# Patient Record
Sex: FEMALE | Race: WHITE
[De-identification: names, ages, dates, MRNs, and addresses within clinical notes are randomized per-mention and may not be internally consistent; named-entity substitution may affect disease eponyms.]

---

## 2017-06-08 NOTE — ER
DATE SEEN:  2017

 

TIME SEEN:  The patient was seen at 1000 hours.

 

CHIEF COMPLAINT:  Vaginal bleeding.

 

HISTORY OF PRESENT ILLNESS:  This  26-year-old  3, para 1-1-0-1,

last menstrual period 2017 (9 weeks pregnant) had intercourse this morning

and resulted in nonpainful postcoital vaginal bleeding, small amount.  She is

wondering if she is losing her pregnancy.

 

PAST MEDICAL HISTORY:  Significant for 1 miscarriage.  She states she is O

negative.

 

MEDICATIONS:  She is on dextroamphetamine - Adderall for probable ADHD.

 

SOCIAL HISTORY:  Works at [employer].  Does not smoke or drink alcohol or

use street drugs.

 

Otherwise, the patient is healthy.

 

REVIEW OF SYSTEMS:  HEENT:  Denies headaches, compromise in vision.

CARDIORESPIRATORY:  Denies chest pain, shortness of breath, or cough.  ABDOMEN:

Denies abdominal pain, nausea, vomiting, diarrhea, constipation, blood in the

stool, black tarry stool, hepatitis, or reflux.  :  As noted above.

MUSCULOSKELETAL:  Denies aches, pains, arthralgia, or myalgia.  NEUROLOGIC:

Negative for stroke, seizures, or head injuries.  PSYCHIATRIC:  Negative.

 

PHYSICAL EXAMINATION:  VITAL SIGNS:  Blood pressure 120/76, heart rate is 97,

respirations 18, oxygen saturation 100% on room, and temperature 36.9 degrees

centigrade.  The patient's weight 81.6 kg.  BMI 27.4.  GENERAL:  Alert woman,

somewhat tearful and anxious.  HEENT:  She has notable to 2 studs of lip

piercing in right bilateral lower lip.  PERRLA intact.  Pharynx without

abnormality.  Mucosa is moist.  NECK:  No cervical adenopathy.  No thyromegaly.

LUNGS:  Clear to auscultation without rales, rhonchi, or wheezes.  HEART:  S1,

S2.  No irregular rate and rhythm.  ABDOMEN:  Soft.  No guarding.  No abdominal

discomfort.  No CVA percussion tenderness.  PELVIC:  Not performed.  The patient

would prefer to have an ultrasound.  EXTREMITIES:  Lower extremities without

abnormality.  Deep tendon reflexes normal in upper and lower extremities.

Cranial nerves 2 through 12 intact.  Oriented x3.  PSYCHIATRIC:  The patient is

sad and is intermittently tearful.

 

LABORATORY DATA:  Pelvic ultrasound was performed, blighted ovum noted.  Serum

HCG was 1800.  Urine, occasional bacteria, moderate squamous epithelial cells,

20 wbc's, 50 to 75 rbc's, large occult blood, and specific gravity 0.010.

 

ASSESSMENT:

1. Incomplete  with vaginal bleeding.

2. Blighted ovum.

3. Vaginal bleeding secondary to incomplete .

4. The patient is not O negative, she is O positive.  The patient's blood was

    typed and screen.

 

PLAN:

1. The patient's status of incomplete  discussed with her, but she was

    quite sad and depressed over this, and had cried a great deal.

2. Follow up with doctor in 2 days.  Repeat the quantitative serum HCG.

3. The patient is not O negative, so will not require RhoGAM the patient.

4. It is possible that the patient's HCG may climb, it is rare that does

    happen, but it can climb in spite of being low at this juncture in her

    presumed pregnancy.

 

Job#: [id number]

DD: 2017 1448

DT: 2017 MILAGROS/MAUREEN

## 2017-06-09 NOTE — US
INDICATION:  First trimester bleeding. 



OB ULTRASOUND FIRST TRIMESTER:  Utilizing transabdominal probe, multiple 
ultrasonic images were obtained, revealing the right ovary to measure 3.29 x 
1.60 cm.  The left ovary measured 2.35 x 1.52 cm. 



Neither ovary remarkable in appearance.  



No mass lesions or free fluid collections were suggested.  No adnexal mass 
lesions specifically were seen.  



The uterus measured 8.52 x 4.24 x 6 cm. 



What appears to be an intrauterine gestation is noted.  A definite fetal pole 
was not seen.  Endovaginal probe ultrasound will be necessary for further 
evaluation.  



IMPRESSION:  Apparent intrauterine gestation.  No definite fetal pole 
identified - endovaginal probe ultrasound will be necessary for further 
evaluation.





INDICATION:  First trimester bleeding, unable to see fetal pole.  



OB ULTRASOUND TRANSVAGINAL:  Utilizing transvaginal probe, multiple ultrasonic 
images revealed no adnexal mass lesions or free fluid collections.  



Gestational sac size - intrauterine and in the fundal area - was compatible 
with approximately 6 weeks 5 days gestational age compared with the LMP GA of 9 
weeks 3 days.  This is a big discrepancy.  



Despite use of endovaginal probe, a fetal pole was not identified.  Also no 
fetal heart motion could be identified.  



IMPRESSION:  No definite fetal pole or fetal heart motion identified in this 
delayed - 6 week 5 day - intrauterine gestational sac.  Findings suggest 
"blighted ovum"/early fetal demise.



MTDD

## 2017-06-22 NOTE — PREOP
ADMISSION DATE:  2017

 

CHIEF COMPLAINT:  Incomplete miscarriage with persistent cramps and bleeding.

 

HISTORY OF PRESENT ILLNESS:  This patient is a  26-year-old 

female,  3, para 1-1-0-1, who was diagnosed with a blighted ovum on

2016 via ultrasound, since then she has had serial beta HCG levels that

have declined and a repeat ultrasound on 2017 revealed persistent blighted

ovum with tissue.  The patient is continuing to have cramps, intermittent having

bleeding and to the point now where she would like to "move on with her life."

Risks and benefits of the surgical intervention have been discussed in detail

and she would like to proceed.  She was therefore scheduled for D and C.  She

has had no fever, chills, or night sweats.  Denies any dysuria or hematuria.

She at times pass clots, but does not feel like she has been passing any tissue.

She has rather severe cramps at times that even with the use of ibuprofen are

unrelenting.  She denies any melena, hematochezia, hematemesis, hemoptysis, or

hematuria.  Has had no other complaints.  She takes Adderall in the extended

release form 30 mg daily and then 10 mg of immediate release in the afternoon,

Zoloft 50 mg daily, and prenatal vitamin daily.

 

ALLERGIES:  Lamictal.

 

SOCIAL HISTORY:  She does not smoke or use alcohol.

 

PAST MEDICAL HISTORY:  Pertinent that she has had a previous miscarriage that

resolved without surgical intervention a number of years ago.  She had a

 section delivery on 2015 and has had a previous wisdom teeth

extraction.

 

FAMILY HISTORY:  Father passed away at age 41 from a farming accident.  He

apparently was otherwise in good health.  Mother is age 57 and in good health.

She has two living brothers, age 29 and 36, both in good health.

 

REVIEW OF SYSTEMS:  Full review of systems was negative except for that

mentioned above.

 

PHYSICAL EXAMINATION:  VITAL SIGNS:  At this time showed her to be afebrile.

Blood pressure 108/66, pulse is 90 and regular, weight was 187.7 pounds.

GENERAL:  Well-developed, well-nourished female, in no acute distress.  HEENT:

Unremarkable.  Mucous membranes are pink and moist.  Thyroid was not enlarged.

Trachea is midline.  CHEST:  Clear to auscultation and percussion.

CARDIOVASCULAR:  Revealed a normal S1 and S2 without murmur, rub, or gallop.

ABDOMEN:  Soft with a healed Pfannenstiel incisional scar.  There was no

specific point tenderness.  No rebound or rigidity.  Bowel sounds are normal.

EXTREMITIES:  Without clubbing, no edema.  No ulcerations or areas of breakdown

were noted.  NEUROLOGIC:  She was intact.

 

IMPRESSION:  Incomplete spontaneous miscarriage.

 

PLAN:  We discussed the risks and benefits of the surgical intervention at

length and after long discussion, she agreed, she would like to proceed with

this surgical intervention and not wait any longer to "get back to her life."

The surgery was scheduled for tomorrow at 0800 hours and she will be kept n.p.o.

after midnight.  She will arrive at the hospital at 0645 hours for preparation

and then proceed from there.

 

Job#: [id number]

DD: 2017 1753

DT: 2017 1842 /MAUREEN

## 2017-06-23 NOTE — OR
DATE OF OPERATION:  2016

 

SURGEON:  Jamal Brandon MD

 

PREOPERATIVE DIAGNOSIS:  Incomplete spontaneous .

 

POSTOPERATIVE DIAGNOSIS:  Incomplete spontaneous .

 

PROCEDURE:  D and C.

 

PROCEDURE IN DETAIL:  After time-out was taken to identify the patient, patient

date of birth, allergies, and appropriate procedure, she was placed in a dorsal

lithotomy position under MAC anesthesia.  The perineum was prepped and draped in

a sterile manner.  Bimanual exam was done revealing approximately eight-sized

anteverted uterus with no adnexal masses.  The cervix would easily accept a

fingertip.  Weighted vaginal speculum was placed and there was some placental

tissue that was protruding out of the vagina, this was removed with forceps and

sent to Pathology.  The uterus was then sounded to 9 cm.  The cervix would

easily accept a #12 Hegar dilator.  A 10 mm suction curette was then placed with

a blunt-tipped and suction curettage was undertaken removing mild to moderate

amount of tissue.  A sharp curette was then placed into the uterine cavity and

gently used to explore the entire cavity with very gentle curettage being

undertaken.  The familiar grating feeling was noted throughout the entire

uterine cavity.  The 10 mm suction curette was then placed back into the

intrauterine space and suction was carried out with little or no tissue,

whatsoever removed.  Bleeding was well controlled.  All instruments were removed

and bimanual exam was redone.  Uterus is approximately 6 to 8 weeks in size,

again anteverted, and no adnexal masses or other abnormalities were noted.  The

sponge and needle counts

were all correct x2, and the patient was awakened, alert and oriented.  She

tolerated the procedure well.  Estimated blood loss was less than 25 mL.  All

instruments and sponges along with needles were accounted for.

 

Job#: [id number]

DD: 2017 0846

DT: 2017 1209 /MODL

## 2018-06-05 NOTE — PCM.LDHP
L&D History of Present Illness





- General


Date of Service: 18


Admit Problem/Dx: 


 Patient Status Order with Admit Dx/Problem





18 19:28


Admission Status [Patient Status] [ADT] Routine 





18 21:35


Admission Status [Patient Status] [ADT] Routine 








 Admission Diagnosis/Problem











Admission Diagnosis/Problem    Pregnancy-related examination














Source of Information: Patient


History Limitations: Reports: No Limitations





- History of Present Illness


Introduction:: 


28 yo  at 38weeks +,who presents in labor.Regular contractions. Scheduled 

for c section on 2018. Has stable ADHD on Meds.Upper normal YOSI on last 

US.Previous C section 


Timing/Duration: Reports: minutes: (2-3)


Location, Pregnancy: Reports: Abdomen


Severity: Moderate


Context: Denies: Sick Contact





- Related Data


Allergies/Adverse Reactions: 


 Allergies











Allergy/AdvReac Type Severity Reaction Status Date / Time


 


lamotrigine [From Lamictal] Allergy  Rash Verified 18 21:55











Home Medications: 


 Home Meds





PNV95/Ferrous Fumarate/FA [Prenatal Tablet] 1 each PO DAILY 12/13/15 [History]


Dextroamphetamine/Amphetamine [Adderall Xr 30 mg Capsule] 1 cap PO DAILY  [History]


Ibuprofen 600 mg PO Q6H #30 tablet 17 [Rx]


Sertraline [Zoloft] 50 mg PO DAILY 17 [History]











Past Medical History





- Past Health History


Medical/Surgical History: Denies Medical/Surgical History


OB/GYN History: Reports: Pregnancy, Spontaneous , Other (See Below)


Other OB/BYN History: , cervical dysplasia


Psychiatric History: Reports: ADHD, Other (See Below)


Other Psychiatric History: POSTPARTUM DEPRESSION, hx of nicotine dependence but 

patient quit smoking.


Dermatologic History: Reports: Eczema





- Infectious Disease History


Infectious Disease History: Reports: Chicken Pox





- Past Surgical History


Female  Surgical History: Reports:  Section





Social & Family History





- Family History


Family Medical History: Noncontributory


Respiratory: Reports: COPD


OBGYN: Reports: Pregnancy


Neurological: Reports: CVA


Psychiatric: Reports: Other (See Below)


Other Psychiatric Family History: Alcholism


Endocrine/Metabolic: Reports: Diabetes, Type I


Oncologic: Reports: Non-Hodgkin's Lymphoma





- Tobacco Use


Smoking Status *Q: Former Smoker


Years of Tobacco use: 10


Used Tobacco, but Quit: Yes


Month/Year Tobacco Last Used: cannot remember





- Caffeine Use


Caffeine Use: Reports: None





- Recreational Drug Use


Recreational Drug Use: No





H&P Review of Systems





- Review of Systems:


Review Of Systems: ROS reveals no pertinent complaints other than HPI.





L&D Exam





- Exam


Exam: See Below





- Vital Signs


Vital Signs: 


 Last Vital Signs











Temp      


 


Pulse  87   18 19:28


 


Resp  17   18 19:28


 


BP  147/89 H  18 19:28


 


Pulse Ox  98   18 19:28











Weight: 108.409 kg





- OB Specific


Contraction Intensity: Mild to Moderate


Fetal Movement: Active


Fetal Heart Tones: Present


Fetal Heart Rate (FHR) Variability: Moderate (6-25 bmp)





- Smith Score


Smith Score Cervix Position: Midposition


Smith Score Effacement: 31-50%


Smith Score Dilation: 1-2 cm


Smith Score Infant's Station: -2





- Exam


General: Alert, Oriented


HEENT: PERRLA, Conjunctiva Clear, EACs Clear, EOMI, Hearing Intact, Mucosa 

Moist & Pink, Nares Patent, Normal Nasal Septum, Posterior Pharynx Clear, TMs 

Clear


Neck: Supple, Trachea Midline


Lungs: Clear to Auscultation, Normal Respiratory Effort


Cardiovascular: Regular Rate, Regular Rhythm


GI/Abdominal Exam: Normal Bowel Sounds, Soft, Non-Tender, No Organomegaly, No 

Distention, No Abnormal Bruit, No Mass, Pelvis Stable


Rectal Exam: Normal Exam, Normal Rectal Tone


Genitourinary: Normal external exam, Normal bimanual exam, Normal speculum exam


Back Exam: Normal Inspection, Full Range of Motion


Extremities: Normal Inspection, Normal Range of Motion, Non-Tender, No Pedal 

Edema, Normal Capillary Refill


Skin: Warm, Dry, Intact


Neurological: Cranial Nerves Intact, Reflexes Equal Bilateral


Psychiatric: Alert, Normal Affect, Normal Mood





- Patient Data


Lab Results Last 24 hrs: 


 Laboratory Results - last 24 hr











  18 Range/Units





  20:35 


 


Urine Color  Yellow  (YELLOW)  


 


Urine Appearance  Clear  (CLEAR)  


 


Urine pH  8.0 H  (5.0-6.5)  


 


Ur Specific Gravity  1.010  (1.010-1.025)  


 


Urine Protein  Negative  (NEGATIVE)  mg/dL


 


Urine Glucose (UA)  Normal  (NEGATIVE)  mg/dL


 


Urine Ketones  Negative  (NEGATIVE)  mg/dL


 


Urine Occult Blood  Large H  (NEGATIVE)  


 


Urine Nitrite  Negative  (NEGATIVE)  


 


Urine Bilirubin  Negative  (NEGATIVE)  


 


Urine Urobilinogen  Normal  (NEGATIVE)  mg/dL


 


Ur Leukocyte Esterase  Negative  (NEGATIVE)  


 


Urine RBC  0-5  (0)  


 


Urine WBC  0-5  (0)  


 


Ur Squamous Epith Cells  Few H  (NS,R,O)  


 


Urine Bacteria  Rare H  (NS)  














- Problem List


(1) Term pregnancy


SNOMED Code(s): 67928979


   ICD Code: Z34.80 - ENCOUNTER FOR SUPRVSN OF NORMAL PREGNANCY, UNSP TRIMESTER

   Status: Acute   Current Visit: Yes   





(2) Previous  section


SNOMED Code(s): 282225019


   ICD Code: Z98.891 - HISTORY OF UTERINE SCAR FROM PREVIOUS SURGERY   Status: 

Acute   Current Visit: Yes   





(3) ADHD


SNOMED Code(s): 925821099


   ICD Code: F90.9 - ATTENTION-DEFICIT HYPERACTIVITY DISORDER, UNSPECIFIED TYPE

   Status: Acute   Current Visit: Yes   


Qualifiers: 


   Attention deficit-hyperactivity disorder type: combined inattentive-

hyperactive   Qualified Code(s): F90.2 - Attention-deficit hyperactivity 

disorder, combined type   





(4) H/O postpartum depression, currently pregnant


SNOMED Code(s): 229768034


   ICD Code: O99.89 - OTH DISEASES AND CONDITIONS COMPL PREG/CHLDBRTH; Z86.59 - 

PERSONAL HISTORY OF OTHER MENTAL AND BEHAVIORAL DISORDERS   Status: Acute   

Current Visit: Yes   


Problem List Initiated/Reviewed/Updated: Yes


Orders Last 24hrs: 


 Active Orders 24 hr











 Category Date Time Status


 


 Admission Status [Patient Status] [ADT] Routine ADT  18 19:28 Active


 


 Admission Status [Patient Status] [ADT] Routine ADT  18 21:35 Active


 


 URINALYSIS W/MICROSCOPIC [UA W/MICROSCOPIC] [URIN] Lab  18 20:35 Ordered





 Routine   


 


 Azithromycin [Zithromax] 500 mg Med  18 22:11 Active





 Sodium Chloride 0.9% [Normal Saline] 250 ml   





 IV ONETIME   


 


 ceFAZolin [Ancef] 2 gm Med  18 22:11 Active





 Premix Bag 1 bag   





 IV ONETIME   








 Medication Orders





Azithromycin 500 mg/ Sodium (Chloride)  250 mls @ 250 mls/hr IV ONETIME ONE


   Stop: 18 23:10


Cefazolin Sodium/Dextrose 2 gm (/ Premix)  50 mls @ 100 mls/hr IV ONETIME ONE


   Stop: 18 22:40








Assessment/Plan Comment:: 


I discussed need for a repat C section. I beleive she is in labor,no need to 

wait for 2018. Proceed to OR tonight.

## 2018-06-05 NOTE — PCM.SN
- Free Text/Narrative


Note: 





26 yo wf who is 38W4D due to for a repeat c section on Friday. arrived tonight 

in active labor. 


for a c section. procedure and risks explained to the pt to include bleeding, 

infection injury to bowel, bladder and blood vessel as well as the baby. 


she asks us to proceed. all questions answered.

## 2018-06-05 NOTE — PCM.OPNOTE
- General Post-Op/Procedure Note


Date of Surgery/Procedure: 06/05/18


Operative Procedure(s): c section


Findings: 





term male infant DEEPIKA presentation 


APGARS 8/9


Pre Op Diagnosis: hx of previous c section


Post-Op Diagnosis: Same


Anesthesia Technique: Spinal


Primary Surgeon: Kartik Bartlett


Secondary Surgeon: Jay Valencia


Anesthesia Provider: Rhys Cedeno


Pathology: 





plancenta 


Output, Urine Amount: 300


EBL in mLs: 500


Complications: None


Condition: Good


Free Text/Narrative:: 





see dictation

## 2018-06-06 NOTE — OR
DATE OF OPERATION:  2018

 

SURGEON:  Kartik Bartlett MD



FIRST ASSISTANT:  Jay Valencia MD 

 

PROCEDURE PERFORMED:   section.

 

PREOPERATIVE DIAGNOSES:

1. Term infant.

2. History of previous section.

 

POSTOPERATIVE DIAGNOSES:

1. Term infant.

2. History of previous section.

 

INDICATIONS FOR PROCEDURE:  This is a 27-year-old white female, who is a G4, P2,

spontaneous AB 2 white female, who presents at 38 weeks 4 days gestation in

labor.  She is scheduled for a repeat  on Friday, but as she was in

labor this evening, it was felt that we should proceed with our  section

at this point.

 

DESCRIPTION OF OPERATION:  After an excellent spinal anesthetic was

administered, the patient was prepped and draped in the usual sterile manner.

After a pinch test to ensure excellent anesthesia, an incision was made through

the previous scar.  The underlying subcu fat was divided using electrocautery.

The fascia was exposed, incised, and transected out laterally involving the

external and internal oblique muscles.  The midline was grasped, elevated, and

incised, and the abdominal cavity was entered.  Using sharp dissection, a

bladder flap was raised, and the bladder was retracted inferiorly.  An incision

was then made at the lower section of the uterus.  A large amount of amniotic

fluid was obtained.  The presentation was noted to be DEEPIKA.  The head was

delivered.  The nuchal cord was reduced, and the anterior and then posterior

shoulders were delivered.  After aspirating the oropharyngeal airway, the

umbilical cord was clamped and divided, and the child was passed off the field.

After obtaining a sample of cord blood, the placenta was delivered and passed

off the field.  Extraplacental membranes were also removed, and the uterus was

wiped clean.  A Pitocin drip was started, and the uterus incision was closed in

2 layers with #1 Vicryl, first consisting of a running interlocking suture,

followed by a running Lembert suture.  After assuring excellent hemostasis, the

pelvis was irrigated, and the uterus was returned to normal anatomic position.

The fascia was then closed with a running #1 Vicryl.  The subcu fat and Aubrey

fascia were approximated with a running #3 Vicryl, and a #3 Vicryl was used to

close the skin.

 

COUNTS:  Needle, sponge, and instrument counts were reported as correct.

 

APGAR SCORE:  Reported to be 8 and 9 nine respectively.

 

ESTIMATED BLOOD LOSS:  500 mL.

 

URINE OUTPUT:  300 mL.

 

FLUIDS:  She received a total of 1200 mL of crystalloid.

 



 

Job#: [id number]

DD: 2018 0003

DT: 2018 012 AS/MAUREEN MEDEROS

## 2018-06-06 NOTE — PCM.PNPP
- General Info


Date of Service: 06/06/18





- Review of Systems


General: Reports: No Symptoms


Pulmonary: Reports: No Symptoms


Cardiovascular: Reports: No Symptoms


Gastrointestinal: Denies: Abdominal Pain, Flatus





- Patient Data


Vital Signs - Most Recent: 


 Last Vital Signs











Temp  36.7 C   06/06/18 04:00


 


Pulse  87   06/06/18 04:00


 


Resp  17   06/06/18 04:00


 


BP  142/84 H  06/06/18 04:00


 


Pulse Ox  98   06/06/18 04:00











Weight - Most Recent: 108.409 kg


I&O - Last 24 Hours: 


 Intake & Output











 06/05/18 06/06/18 06/06/18





 22:59 06:59 14:59


 


Intake Total  806 400


 


Output Total  850 


 


Balance  -44 400











Lab Results - Last 24 Hours: 


 Laboratory Results - last 24 hr











  06/05/18 06/06/18 Range/Units





  20:35 06:21 


 


WBC   10.6  (4.5-12.0)  X10-3/uL


 


RBC   3.60  (3.23-5.20)  x10(6)uL


 


Hgb   9.8 L  (11.5-15.5)  g/dL


 


Hct   29.9 L  (30.0-51.3)  %


 


MCV   83.0  (80-96)  fL


 


MCH   27.3 L  (27.7-33.6)  pg


 


MCHC   32.8  (32.2-35.4)  g/dL


 


RDW   17.0 H  (11.5-15.5)  %


 


Plt Count   199  (125-369)  X10(3)uL


 


MPV   9.6  (7.4-10.4)  fL


 


Add Manual Diff   Yes  


 


Neutrophils % (Manual)   80  (46-82)  %


 


Lymphocytes % (Manual)   19  (13-37)  %


 


Monocytes % (Manual)   1 L  (4-12)  %


 


Urine Color  Yellow   (YELLOW)  


 


Urine Appearance  Clear   (CLEAR)  


 


Urine pH  8.0 H   (5.0-6.5)  


 


Ur Specific Gravity  1.010   (1.010-1.025)  


 


Urine Protein  Negative   (NEGATIVE)  mg/dL


 


Urine Glucose (UA)  Normal   (NEGATIVE)  mg/dL


 


Urine Ketones  Negative   (NEGATIVE)  mg/dL


 


Urine Occult Blood  Large H   (NEGATIVE)  


 


Urine Nitrite  Negative   (NEGATIVE)  


 


Urine Bilirubin  Negative   (NEGATIVE)  


 


Urine Urobilinogen  Normal   (NEGATIVE)  mg/dL


 


Ur Leukocyte Esterase  Negative   (NEGATIVE)  


 


Urine RBC  0-5   (0)  


 


Urine WBC  0-5   (0)  


 


Ur Squamous Epith Cells  Few H   (NS,R,O)  


 


Urine Bacteria  Rare H   (NS)  











Med Orders - Current: 


 Current Medications





Ephedrine Sulfate (Ephedrine Sulfate)  5 mg IVPUSH ASDIRECTED PRN


   PRN Reason: Other


Lactated Ringer's (Ringers, Lactated)  1,000 mls @ 150 mls/hr IV [facility]RECTED [facility]


   Last Admin: 06/06/18 09:36 Dose:  150 mls/hr


Ketorolac Tromethamine (Toradol)  30 mg IVPUSH Q8H PRN


   PRN Reason: Pain


   Stop: 06/10/18 23:53


   Last Admin: 06/06/18 10:06 Dose:  30 mg


Naloxone HCl (Narcan)  0.1 mg IVPUSH ONETIME PRN


   PRN Reason: Respiratory Depression


Non-Formulary Medication (Dextroamphetamine/Amphetamine [Adderall Xr 30 Mg 

Capsule])  1 cap PO DAILY [facility]


Ondansetron HCl (Zofran)  4 mg IV Q4H PRN


   PRN Reason: Nausea/Vomiting


Oxycodone/Acetaminophen (Percocet 325-5 Mg)  2 tab PO Q4H PRN


   PRN Reason: Pain (moderate 4-6)


Sertraline HCl (Zoloft)  50 mg PO DAILY [facility]


   Last Admin: 06/06/18 09:35 Dose:  50 mg





Discontinued Medications





Citric Acid/Sodium Citrate (Bicitra Solution)  30 ml PO ONETIME ONE


   Stop: 06/05/18 21:55


   Last Admin: 06/05/18 22:20 Dose:  30 ml


Azithromycin 500 mg/ Sodium (Chloride)  250 mls @ 250 mls/hr IV ONETIME ONE


   Stop: 06/05/18 23:10


   Last Admin: 06/05/18 22:55 Dose:  250 mls/hr


Cefazolin Sodium/Dextrose 2 gm (/ Premix)  50 mls @ 100 mls/hr IV ONETIME ONE


   Stop: 06/05/18 22:40


   Last Admin: 06/05/18 22:22 Dose:  100 mls/hr


Ketorolac Tromethamine (Toradol)  30 mg IVPUSH Q8H [facility]


   Stop: 06/10/18 23:53


   Last Admin: 06/06/18 09:04 Dose:  Not Given


Nalbuphine HCl (Nubain)  10 mg IM ONETIME ONE


   Stop: 06/05/18 21:34


   Last Admin: 06/05/18 21:45 Dose:  Not Given


Scopolamine (Transderm-Scop)  1.5 mg TRDERM ONETIME ONE


   Stop: 06/05/18 21:54


   Last Admin: 06/05/18 22:19 Dose:  1.5 mg











- Infant Interaction


Support Person: 





- Postpartum Recovery Exam


Fundal Tone: Firm


Fundal Level: 1 Fingerbreadths Above Umbilicus


Fundal Placement: Midline


Lochia Amount: Small


Lochia Color: Rubra/Red


Perineum Description: Intact, Minimal Bruising/Swelling, Edematous


Episiotomy/Laceration: None


Bladder Status: Indwelling Catheter in Place


Urinary Elimination: Indwelling Catheter





- Exam


Lungs: Clear to Auscultation, Normal Respiratory Effort


Cardiovascular: Regular Rate, Regular Rhythm


GI/Abdominal Exam: Soft, Abnormal Bowel Sounds (hypoactive )


Skin: Warm, Dry, Intact


Wound/Incisions: Dressing Dry and Intact





- Problem List Review


Problem List Initiated/Reviewed/Updated: Yes





- My Orders


Last 24 Hours: 


My Active Orders





06/05/18 23:45


Lactated Ringers [Ringers, Lactated] 1,000 ml IV ASDIRECTED 





06/05/18 23:50


Ambulate [RC] PER UNIT ROUTINE 


Communication Order [RC] Per Unit Routine 


Cooling Warming Measures [RC] ASDIRECTED 


Intake and Output [RC] Q4H 


Notify Provider Vital Signs [RC] ASDIRECTED 


Vital Signs [RC] Q4HR 


Wound Care [RC] QSHIFT 


Acetaminophen/oxyCODONE [Percocet 325-5 MG]   2 tab PO Q4H PRN 


Naloxone [Narcan]   0.1 mg IVPUSH ONETIME PRN 


Ondansetron [Zofran]   4 mg IV Q4H PRN 


ePHEDrine [ePHEDrine Sulfate]   5 mg IVPUSH ASDIRECTED PRN 


Assess Lochia [WOMSER] Per Unit Routine 


Assess Uterine Involution [WOMSER] Per Unit Routine 


Breast Pump [WOMSER] Per Unit Routine 


DVT/VTE Prophylaxis Reflex [OM.PC] Routine 


Sequential Compression Device [OM.PC] Routine 


Resuscitation Status Routine 





06/05/18 23:51


RT Incentive Spirometry [RC] Q2HWA 


Heat Therapy [OM.PC] Per Unit Routine 


Ice Therapy [OM.PC] Per Unit Routine 





06/05/18 23:53


Antiembolic Devices [RC] .Routine 


VTE/DVT Education [RC] Click to Edit 





06/06/18 09:00


Dextroamphetamine/Amphetamine [Adderall Xr 30 mg Capsule]   1 cap PO DAILY 


Sertraline [Zoloft]   50 mg PO DAILY 





06/06/18 09:34


DC Nicolas Catheter [Urinary Catheter Removal] [RC] Per Unit Routine 


Ketorolac [Toradol]   30 mg IVPUSH Q8H PRN 














- Assessment


Assessment:: 





doing well 





- Plan


Plan:: 


nicolas d/c'd


 will decrease iv rate.

## 2018-06-07 NOTE — PCM.DCSUM1
**Discharge Summary





- Hospital Course


Free Text/Narrative:: 





Pt admitted in labor and was taken to the OR for a c section. 


Was passing flatus on the next morning less than 24 hrs. She was advanced from 

a clear liquid to a regular diet. This she has tolerated. 





Michael was removed the day of surgery and she has maintained good u/o since 

surgery. 





she is demonstrating adequate pain control today and would like to go home. 








- Discharge Data


Discharge Date: 18


Discharge Disposition: Home, Self-Care 01


Condition: Good





- Discharge Diagnosis/Problem(s)


(1) S/P repeat low transverse 


SNOMED Code(s): 460662619, 393168266, 024614557, 816036499


   ICD Code: Z98.891 - HISTORY OF UTERINE SCAR FROM PREVIOUS SURGERY   Status: 

Acute   Current Visit: Yes   





- Patient Summary/Data


Operative Procedure(s) Performed: c section


Complications: none





- Patient Instructions


Diet: Usual Diet as Tolerated


Activity: No Lifting Over 25 Pounds


Driving: Do Not Drive


Showering/Bathing: May Shower


Wound/Incision, Other: dressing off later today. 


Notify Provider of: Fever, Increased Pain, Swelling and Redness





- Discharge Plan


Home Medications: 


 Home Meds





PNV95/Ferrous Fumarate/FA [Prenatal Tablet] 1 each PO DAILY 12/13/15 [History]


Dextroamphetamine/Amphetamine [Adderall Xr 30 mg Capsule] 1 cap PO DAILY  [History]


Ibuprofen 600 mg PO Q6H #30 tablet 17 [Rx]


Sertraline [Zoloft] 50 mg PO DAILY 17 [History]








Patient Handouts:  Breastfeeding, Postpartum Baby Blues, Hand Washing, Easy-to-

Read,  Delivery, Care After, Postpartum Care After  Delivery, 

Venous Thromboembolism Prevention


Referrals: 


Kartik Bartlett MD [Physician] - 18





- Discharge Summary/Plan Comment


DC Time >30 min.: No





- Patient Data


Vitals - Most Recent: 


 Last Vital Signs











Temp  36.6 C   18 04:10


 


Pulse  72   18 04:10


 


Resp  18   18 04:10


 


BP  122/75   18 04:10


 


Pulse Ox  98   18 04:10











Weight - Most Recent: 108.409 kg


I&O - Last 24 hours: 


 Intake & Output











 18





 22:59 06:59 14:59


 


Output Total 1900  


 


Balance -1900  











Med Orders - Current: 


 Current Medications





Ephedrine Sulfate (Ephedrine Sulfate)  5 mg IVPUSH ASDIRECTED PRN


   PRN Reason: Other


Ketorolac Tromethamine (Toradol)  30 mg IVPUSH Q8H PRN


   PRN Reason: Pain


   Stop: 06/10/18 23:53


   Last Admin: 18 10:06 Dose:  30 mg


Naloxone HCl (Narcan)  0.1 mg IVPUSH ONETIME PRN


   PRN Reason: Respiratory Depression


Non-Formulary Medication (Dextroamphetamine/Amphetamine [Adderall Xr 30 Mg 

Capsule])  1 cap PO DAILY [facility]


   Last Admin: 18 09:31 Dose:  Not Given


Ondansetron HCl (Zofran)  4 mg IV Q4H PRN


   PRN Reason: Nausea/Vomiting


Oxycodone/Acetaminophen (Percocet 325-5 Mg)  2 tab PO Q4H PRN


   PRN Reason: Pain (moderate 4-6)


   Last Admin: 18 09:09 Dose:  2 tab


Sertraline HCl (Zoloft)  50 mg PO DAILY [facility]


   Last Admin: 18 09:09 Dose:  50 mg





Discontinued Medications





Citric Acid/Sodium Citrate (Bicitra Solution)  30 ml PO ONETIME ONE


   Stop: 18 21:55


   Last Admin: 18 22:20 Dose:  30 ml


Azithromycin 500 mg/ Sodium (Chloride)  250 mls @ 250 mls/hr IV ONETIME ONE


   Stop: 18 23:10


   Last Admin: 18 22:55 Dose:  250 mls/hr


Cefazolin Sodium/Dextrose 2 gm (/ Premix)  50 mls @ 100 mls/hr IV ONETIME ONE


   Stop: 18 22:40


   Last Admin: 18 22:22 Dose:  100 mls/hr


Lactated Ringer's (Ringers, Lactated)  1,000 mls @ 125 mls/hr IV ASDIRECTED [facility]


   Last Admin: 18 09:36 Dose:  150 mls/hr


Ketorolac Tromethamine (Toradol)  30 mg IVPUSH Q8H [facility]


   Stop: 06/10/18 23:53


   Last Admin: 18 09:04 Dose:  Not Given


Nalbuphine HCl (Nubain)  10 mg IM ONETIME ONE


   Stop: 18 21:34


   Last Admin: 18 21:45 Dose:  Not Given


Scopolamine (Transderm-Scop)  1.5 mg TRDERM ONETIME ONE


   Stop: 18 21:54


   Last Admin: 18 22:19 Dose:  1.5 mg

## 2018-06-07 NOTE — PCM.PNPP
- General Info


Date of Service: 18


Functional Status: Reports: Pain Controlled, Tolerating Diet, Ambulating, 

Urinating





- Review of Systems


General: Reports: No Symptoms


Pulmonary: Reports: No Symptoms


Cardiovascular: Reports: No Symptoms


Gastrointestinal: Reports: No Symptoms





- Patient Data


Vital Signs - Most Recent: 


 Last Vital Signs











Temp  36.6 C   18 04:10


 


Pulse  72   18 04:10


 


Resp  18   18 04:10


 


BP  122/75   18 04:10


 


Pulse Ox  98   18 04:10











Weight - Most Recent: 108.409 kg


I&O - Last 24 Hours: 


 Intake & Output











 18





 22:59 06:59 14:59


 


Output Total 1900  


 


Balance -1900  











Med Orders - Current: 


 Current Medications





Ephedrine Sulfate (Ephedrine Sulfate)  5 mg IVPUSH ASDIRECTED PRN


   PRN Reason: Other


Ketorolac Tromethamine (Toradol)  30 mg IVPUSH Q8H PRN


   PRN Reason: Pain


   Stop: 06/10/18 23:53


   Last Admin: 18 10:06 Dose:  30 mg


Naloxone HCl (Narcan)  0.1 mg IVPUSH ONETIME PRN


   PRN Reason: Respiratory Depression


Non-Formulary Medication (Dextroamphetamine/Amphetamine [Adderall Xr 30 Mg 

Capsule])  1 cap PO DAILY [facility]


   Last Admin: 18 09:31 Dose:  Not Given


Ondansetron HCl (Zofran)  4 mg IV Q4H PRN


   PRN Reason: Nausea/Vomiting


Oxycodone/Acetaminophen (Percocet 325-5 Mg)  2 tab PO Q4H PRN


   PRN Reason: Pain (moderate 4-6)


   Last Admin: 18 09:09 Dose:  2 tab


Sertraline HCl (Zoloft)  50 mg PO DAILY [facility]


   Last Admin: 18 09:09 Dose:  50 mg





Discontinued Medications





Citric Acid/Sodium Citrate (Bicitra Solution)  30 ml PO ONETIME ONE


   Stop: 18 21:55


   Last Admin: 18 22:20 Dose:  30 ml


Azithromycin 500 mg/ Sodium (Chloride)  250 mls @ 250 mls/hr IV ONETIME ONE


   Stop: 18 23:10


   Last Admin: 18 22:55 Dose:  250 mls/hr


Cefazolin Sodium/Dextrose 2 gm (/ Premix)  50 mls @ 100 mls/hr IV ONETIME ONE


   Stop: 18 22:40


   Last Admin: 18 22:22 Dose:  100 mls/hr


Lactated Ringer's (Ringers, Lactated)  1,000 mls @ 125 mls/hr IV ASDIRECTED [facility]


   Last Admin: 18 09:36 Dose:  150 mls/hr


Ketorolac Tromethamine (Toradol)  30 mg IVPUSH Q8H [facility]


   Stop: 06/10/18 23:53


   Last Admin: 18 09:04 Dose:  Not Given


Nalbuphine HCl (Nubain)  10 mg IM ONETIME ONE


   Stop: 18 21:34


   Last Admin: 18 21:45 Dose:  Not Given


Scopolamine (Transderm-Scop)  1.5 mg TRDERM ONETIME ONE


   Stop: 18 21:54


   Last Admin: 18 22:19 Dose:  1.5 mg











- Infant Interaction


Infant Disposition, Postpartum:  in Room with Family


Support Person: 





- Postpartum Recovery Exam


Fundal Tone: Firm


Fundal Level: At Umbilicus


Fundal Placement: Midline


Lochia Amount: Small


Lochia Color: Rubra/Red


Perineum Description: Intact, Minimal Bruising/Swelling, Edematous


Episiotomy/Laceration: None


Bladder Status: Voiding


Urinary Elimination: Voided





- Exam


General: Alert, Oriented


HEENT: Pupils Equal


Lungs: Clear to Auscultation, Normal Respiratory Effort


Cardiovascular: Regular Rate, Regular Rhythm


GI/Abdominal Exam: Normal Bowel Sounds, Soft, Non-Tender


Skin: Warm, Dry, Intact


Wound/Incisions: Dressing Dry and Intact





- Problem List & Annotations


(1) S/P repeat low transverse 


SNOMED Code(s): 777283685, 962378360, 422906377, 914929432


   Code(s): Z98.891 - HISTORY OF UTERINE SCAR FROM PREVIOUS SURGERY   Status: 

Acute   Current Visit: Yes   





- Problem List Review


Problem List Initiated/Reviewed/Updated: Yes





- My Orders


Last 24 Hours: 


My Active Orders





18 17:04


Convert IV to Saline Lock [OM.PC] Routine 





18 10:40


Bisacodyl [Dulcolax]   10 mg RECTAL ONETIME ONE 





18 Dinner


Regular Diet [DIET] 














- Assessment


Assessment:: 


ready for discharge 





- Plan


Plan:: 


dulcolox suppository 


discharge today.

## 2021-12-27 ENCOUNTER — HOSPITAL ENCOUNTER (EMERGENCY)
Dept: HOSPITAL 7 - FB.ED | Age: 31
Discharge: HOME | End: 2021-12-27
Payer: MEDICAID

## 2021-12-27 DIAGNOSIS — Z88.8: ICD-10-CM

## 2021-12-27 DIAGNOSIS — K05.10: ICD-10-CM

## 2021-12-27 DIAGNOSIS — K02.9: Primary | ICD-10-CM

## 2021-12-27 DIAGNOSIS — Z79.899: ICD-10-CM

## 2021-12-27 NOTE — EDM.PDOC
ED HPI GENERAL MEDICAL PROBLEM





- General


Stated Complaint: ABSCESS TOOTH


Time Seen by Provider: 21 13:30


Source of Information: Reports: Patient


History Limitations: Reports: No Limitations





- History of Present Illness


INITIAL COMMENTS - FREE TEXT/NARRATIVE: 





Patient presented to the ED because of dental pain which started 3 days ago and 

is getting worse. the pain is 10/10, she took OTC aleve and tylenol without 

relief.





- Related Data


                                    Allergies











Allergy/AdvReac Type Severity Reaction Status Date / Time


 


lamotrigine [From Lamictal] Allergy  Rash Verified 10/26/19 23:00











Home Meds: 


                                    Home Meds





Pnv No.95/Ferrous Fum/Folic AC [Prenatal Tablet] 1 each PO DAILY 12/13/15 

[History]


Dextroamphetamine/Amphetamine [Adderall Xr 30 mg Capsule] 1 cap PO DAILY 

17 [History]


Sertraline [Zoloft] 50 mg PO DAILY 17 [History]


Ibuprofen [Motrin] 600 mg PO Q6H PRN #28 tab 18 [Rx]


Dextroamphetamine/Amphetamine [Adderall 10 mg Tablet] 10 mg PO DAILY 10/27/19 

[History]


Acetaminophen/oxyCODONE [Percocet 325-5 MG] 1 tab PO Q4H PRN #15 tablet 10/28/19

[Rx]


Ibuprofen [Motrin] 600 mg PO Q6H  tablet 10/28/19 [Rx]


Amoxicillin 875 mg PO BID #20 tablet 21 [Rx]











Past Medical History





- Past Health History


Medical/Surgical History: Denies Medical/Surgical History


OB/GYN History: Reports: Pregnancy, Spontaneous , Other (See Below)


Other OB/GYN History: , cervical dysplasia


Psychiatric History: Reports: ADHD, Other (See Below)


Other Psychiatric History: POSTPARTUM DEPRESSION, hx of nicotine dependence but 

patient quit smoking.


Dermatologic History: Reports: Eczema





- Infectious Disease History


Infectious Disease History: Reports: Chicken Pox





- Past Surgical History


Female  Surgical History: Reports:  Section





Social & Family History





- Family History


Family Medical History: No Pertinent Family History


Respiratory: Reports: COPD


OBGYN: Reports: Pregnancy


Neurological: Reports: CVA


Psychiatric: Reports: Other (See Below)


Other Psychiatric Family History: Alcholism


Endocrine/Metabolic: Reports: Diabetes, Type I


Oncologic: Reports: Non-Hodgkin's Lymphoma





- Caffeine Use


Caffeine Use: Reports: Coffee





ED ROS ENT





- Review of Systems


Review Of Systems: See Below


Constitutional: Reports: No Symptoms


HEENT: Reports: Dental Pain


Respiratory: Reports: No Symptoms


Cardiovascular: Reports: No Symptoms


Endocrine: Reports: No Symptoms


GI/Abdominal: Reports: No Symptoms


: Reports: No Symptoms


Musculoskeletal: Reports: No Symptoms


Skin: Reports: No Symptoms


Neurological: Reports: No Symptoms





ED EXAM, ENT





- Physical Exam


Exam: See Below


Exam Limited By: No Limitations


General Appearance: Alert, No Apparent Distress


Ears: Normal External Exam, Normal Canal, Hearing Grossly Normal


Nose: Normal Inspection, Normal Mucousa, No Blood


Mouth/Throat: Normal Inspection, Dental Pain, Gum Swelling


Head: Atraumatic, Normocephalic


Neck: Normal Inspection, Supple, Non-Tender, Full Range of Motion


Respiratory/Chest: No Respiratory Distress, Lungs Clear, Normal Breath Sounds, 

No Accessory Muscle Use, Chest Non-Tender


Cardiovascular: Normal Peripheral Pulses, Regular Rate, Rhythm, No Edema, No 

Gallop


GI/Abdominal: Normal Bowel Sounds, Soft, Non-Tender, No Organomegaly, No 

Distention, No Abnormal Bruit


Back: Normal Inspection, Full Range of Motion


Extremities: Normal Inspection, Normal Range of Motion, Non-Tender, No Pedal 

Edema, Normal Capillary Refill


Neurological: Alert, Oriented, CN II-XII Intact, Normal Cognition, Normal 

Reflexes, No Motor/Sensory Deficits


Psychiatric: Normal Affect, Normal Mood


Skin: Warm





Course





- Vital Signs


Text/Narrative:: 





Viscous lidocaine 


Toradol 60 mg IM x1





- Orders/Labs/Meds


Meds: 


Medications














Discontinued Medications














Generic Name Dose Route Start Last Admin





  Trade Name Aure  PRN Reason Stop Dose Admin


 


Ketorolac Tromethamine  60 mg  21 13:30  21 13:43





  Ketorolac 30 Mg/Ml Sdv  IM  21 13:31  60 mg





  NOW STA   Administration


 


Lidocaine HCl  15 ml  21 13:31  21 13:44





  Lidocaine 2% Viscous Solution 15 Ml Cup  PO  21 13:32  15 ml





  ONETIME ONE   Administration














Departure





- Departure


Time of Disposition: 14:00


Disposition: Home, Self-Care 01


Condition: Good


Clinical Impression: 


 Pain due to dental caries, Gingivitis








- Discharge Information


Prescriptions: 


Amoxicillin 875 mg PO BID #20 tablet


Instructions:  Gingivitis, Easy-to-Read, Dental Caries, Adult, Easy-to-Read


Referrals: 


Aimee De Santiago NP [Primary Care Provider] - 


Additional Instructions: 


Please read discharge instructions on dental pain and gingivitis


Gurgle with salt and water


Take ibuprofen 800 mg with tylenol 1000 mg every 8 hours as needed for pain


Apply the viscous lidocaine as instructed for pain that you can't tolerate


Follow up with your dentis  as soon as you can

## 2021-12-28 VITALS — SYSTOLIC BLOOD PRESSURE: 145 MMHG | DIASTOLIC BLOOD PRESSURE: 106 MMHG | HEART RATE: 87 BPM

## 2022-10-16 ENCOUNTER — HOSPITAL ENCOUNTER (EMERGENCY)
Dept: HOSPITAL 7 - FB.ED | Age: 32
Discharge: HOME | End: 2022-10-16
Payer: MEDICAID

## 2022-10-16 VITALS — SYSTOLIC BLOOD PRESSURE: 151 MMHG | HEART RATE: 91 BPM | DIASTOLIC BLOOD PRESSURE: 94 MMHG

## 2022-10-16 DIAGNOSIS — Z79.899: ICD-10-CM

## 2022-10-16 DIAGNOSIS — K08.89: ICD-10-CM

## 2022-10-16 DIAGNOSIS — F98.8: ICD-10-CM

## 2022-10-16 DIAGNOSIS — K04.7: Primary | ICD-10-CM

## 2022-10-16 PROCEDURE — 99282 EMERGENCY DEPT VISIT SF MDM: CPT

## 2022-10-16 PROCEDURE — 96372 THER/PROPH/DIAG INJ SC/IM: CPT
